# Patient Record
Sex: FEMALE | Race: WHITE | NOT HISPANIC OR LATINO | URBAN - METROPOLITAN AREA
[De-identification: names, ages, dates, MRNs, and addresses within clinical notes are randomized per-mention and may not be internally consistent; named-entity substitution may affect disease eponyms.]

---

## 2019-11-07 ENCOUNTER — SEE NOTE (OUTPATIENT)
Dept: URBAN - METROPOLITAN AREA CLINIC 32 | Facility: CLINIC | Age: 49
Setting detail: DERMATOLOGY
End: 2019-11-07

## 2019-11-07 DIAGNOSIS — D49.2 NEOPLASM OF UNSPECIFIED BEHAVIOR OF BONE, SOFT TISSUE, AND SKIN: ICD-10-CM

## 2019-11-07 PROCEDURE — 99202 OFFICE O/P NEW SF 15 MIN: CPT

## 2019-11-07 RX ORDER — ALUMINUM CHLORIDE 20 %
1 ML SOLUTION, NON-ORAL TOPICAL QHS
Qty: 60 | Refills: 6
Start: 2019-11-07

## 2021-05-11 ENCOUNTER — WEB ENCOUNTER (OUTPATIENT)
Dept: URBAN - METROPOLITAN AREA CLINIC 50 | Facility: CLINIC | Age: 51
End: 2021-05-11

## 2021-05-11 ENCOUNTER — OFFICE VISIT (OUTPATIENT)
Dept: URBAN - METROPOLITAN AREA CLINIC 50 | Facility: CLINIC | Age: 51
End: 2021-05-11
Payer: COMMERCIAL

## 2021-05-11 VITALS
WEIGHT: 168 LBS | BODY MASS INDEX: 32.98 KG/M2 | HEART RATE: 83 BPM | SYSTOLIC BLOOD PRESSURE: 123 MMHG | TEMPERATURE: 97.5 F | DIASTOLIC BLOOD PRESSURE: 81 MMHG | HEIGHT: 60 IN

## 2021-05-11 DIAGNOSIS — I88.9 LYMPHADENITIS: ICD-10-CM

## 2021-05-11 DIAGNOSIS — K21.00 GASTROESOPHAGEAL REFLUX DISEASE WITH ESOPHAGITIS WITHOUT HEMORRHAGE: ICD-10-CM

## 2021-05-11 PROCEDURE — 99213 OFFICE O/P EST LOW 20 MIN: CPT | Performed by: INTERNAL MEDICINE

## 2021-05-11 RX ORDER — ROSUVASTATIN CALCIUM 10 MG/1
1 TABLET TABLET, FILM COATED ORAL ONCE A DAY
Status: ACTIVE | COMMUNITY

## 2021-05-11 RX ORDER — TOPIRAMATE 25 MG/1
1 CAPSULE CAPSULE, COATED PELLETS ORAL ONCE A DAY
Status: ACTIVE | COMMUNITY

## 2021-05-11 RX ORDER — PANTOPRAZOLE SODIUM 40 MG/1
1 TABLET TABLET, DELAYED RELEASE ORAL ONCE A DAY
Qty: 30 | Refills: 3 | OUTPATIENT
Start: 2021-05-11

## 2021-05-11 RX ORDER — ERGOCALCIFEROL 1.25 MG/1
CAPSULE ORAL
Qty: 0 | Refills: 0 | Status: ACTIVE | COMMUNITY
Start: 1900-01-01

## 2021-05-11 RX ORDER — OMEPRAZOLE 40 MG/1
TAKE 1 CAPSULE (40 MG) BY ORAL ROUTE ONCE DAILY BEFORE A MEAL FOR 30 DAYS CAPSULE, DELAYED RELEASE PELLETS ORAL 1
Qty: 30 | Refills: 6 | Status: ACTIVE | COMMUNITY
Start: 2017-10-04

## 2021-05-11 NOTE — HPI-TODAY'S VISIT:
had egd/colon with dr. floyd in 2019. had bad gerd, been taking protonix for years. went to see pcp last month b/c her throat was hurting 5 days later, ENT told her it was very inflammed. ENT advised to take ppi twice a day but she did not do this. 2 days ago, neck and throat got worse. went to see pcp dr. johns.  was told she has a swollen LN. currently on an antibiotic for lymphadenitis, ? keflex Dr. clark is her ent, told it was gerd hx of losing voice pcp told her it was chronic otitis media has f/u with [facility] for labs and ct next week for Lymphadenitis has nto had covid vaccine

## 2021-10-12 ENCOUNTER — TELEPHONE ENCOUNTER (OUTPATIENT)
Dept: URBAN - METROPOLITAN AREA CLINIC 23 | Facility: CLINIC | Age: 51
End: 2021-10-12

## 2021-10-12 ENCOUNTER — TELEPHONE ENCOUNTER (OUTPATIENT)
Dept: URBAN - METROPOLITAN AREA CLINIC 98 | Facility: CLINIC | Age: 51
End: 2021-10-12

## 2021-10-12 RX ORDER — PANTOPRAZOLE SODIUM 40 MG/1
1 TABLET TABLET, DELAYED RELEASE ORAL ONCE A DAY
Qty: 30 | Refills: 3
Start: 2021-05-11

## 2021-10-13 ENCOUNTER — LAB OUTSIDE AN ENCOUNTER (OUTPATIENT)
Dept: URBAN - METROPOLITAN AREA CLINIC 80 | Facility: CLINIC | Age: 51
End: 2021-10-13

## 2021-10-13 ENCOUNTER — OFFICE VISIT (OUTPATIENT)
Dept: URBAN - METROPOLITAN AREA CLINIC 80 | Facility: CLINIC | Age: 51
End: 2021-10-13
Payer: COMMERCIAL

## 2021-10-13 DIAGNOSIS — K21.9 GASTROESOPHAGEAL REFLUX DISEASE, UNSPECIFIED WHETHER ESOPHAGITIS PRESENT: ICD-10-CM

## 2021-10-13 DIAGNOSIS — R49.0 HOARSE: ICD-10-CM

## 2021-10-13 DIAGNOSIS — Z80.0 FAMILY HISTORY OF COLON CANCER: ICD-10-CM

## 2021-10-13 PROCEDURE — 99213 OFFICE O/P EST LOW 20 MIN: CPT | Performed by: INTERNAL MEDICINE

## 2021-10-13 RX ORDER — TOPIRAMATE 25 MG/1
1 CAPSULE CAPSULE, COATED PELLETS ORAL ONCE A DAY
Status: ACTIVE | COMMUNITY

## 2021-10-13 RX ORDER — OMEPRAZOLE 40 MG/1
TAKE 1 CAPSULE (40 MG) BY ORAL ROUTE ONCE DAILY BEFORE A MEAL FOR 30 DAYS CAPSULE, DELAYED RELEASE PELLETS ORAL 1
Qty: 30 | Refills: 6 | Status: DISCONTINUED | COMMUNITY
Start: 2017-10-04

## 2021-10-13 RX ORDER — ROSUVASTATIN CALCIUM 10 MG/1
1 TABLET TABLET, FILM COATED ORAL ONCE A DAY
Status: ACTIVE | COMMUNITY

## 2021-10-13 RX ORDER — PANTOPRAZOLE SODIUM 40 MG/1
1 TABLET TABLET, DELAYED RELEASE ORAL ONCE A DAY
Qty: 30 | Refills: 3 | Status: ACTIVE | COMMUNITY
Start: 2021-05-11

## 2021-10-13 RX ORDER — ERGOCALCIFEROL 1.25 MG/1
CAPSULE ORAL
Qty: 0 | Refills: 0 | Status: DISCONTINUED | COMMUNITY
Start: 1900-01-01

## 2021-10-13 NOTE — HPI-TODAY'S VISIT:
Patient states she has bad acid reflux - her last egd and colon were in 2019  She has increased burning in her stomach and esophagus She was taking pantoprazole and ran out of it about a week and a half ago - has been miserable with it - drinking milk like crazy - she finally had it called back in today She is also having increased burning on urination - scheduled to see gyn on Friday  No hematuria - does have a foul odor BM were good but lately have been a little different but overall 1 BM in am No BRBPR or melena Her last colon was  - her sister  of colon cancer - her last colon was a fair prep and advised to repeat in 3 years - which would be 2022 No dysphagia

## 2021-10-25 ENCOUNTER — OFFICE VISIT (OUTPATIENT)
Dept: URBAN - METROPOLITAN AREA SURGERY CENTER 19 | Facility: SURGERY CENTER | Age: 51
End: 2021-10-25

## 2021-10-25 ENCOUNTER — CLAIMS CREATED FROM THE CLAIM WINDOW (OUTPATIENT)
Dept: URBAN - METROPOLITAN AREA SURGERY CENTER 19 | Facility: SURGERY CENTER | Age: 51
End: 2021-10-25
Payer: COMMERCIAL

## 2021-10-25 DIAGNOSIS — R12 HEARTBURN: ICD-10-CM

## 2021-10-25 DIAGNOSIS — K31.89 ACQUIRED DEFORMITY OF DUODENUM: ICD-10-CM

## 2021-10-25 PROCEDURE — G8907 PT DOC NO EVENTS ON DISCHARG: HCPCS | Performed by: INTERNAL MEDICINE

## 2021-10-25 PROCEDURE — 43239 EGD BIOPSY SINGLE/MULTIPLE: CPT | Performed by: INTERNAL MEDICINE

## 2021-10-25 RX ORDER — ROSUVASTATIN CALCIUM 10 MG/1
1 TABLET TABLET, FILM COATED ORAL ONCE A DAY
Status: ACTIVE | COMMUNITY

## 2021-10-25 RX ORDER — PANTOPRAZOLE SODIUM 40 MG/1
1 TABLET TABLET, DELAYED RELEASE ORAL ONCE A DAY
Qty: 30 | Refills: 3 | Status: ACTIVE | COMMUNITY
Start: 2021-05-11

## 2021-10-25 RX ORDER — TOPIRAMATE 25 MG/1
1 CAPSULE CAPSULE, COATED PELLETS ORAL ONCE A DAY
Status: ACTIVE | COMMUNITY

## 2021-10-26 ENCOUNTER — OFFICE VISIT (OUTPATIENT)
Dept: URBAN - METROPOLITAN AREA SURGERY CENTER 18 | Facility: SURGERY CENTER | Age: 51
End: 2021-10-26

## 2021-12-23 ENCOUNTER — TELEPHONE ENCOUNTER (OUTPATIENT)
Dept: URBAN - METROPOLITAN AREA CLINIC 6 | Facility: CLINIC | Age: 51
End: 2021-12-23

## 2021-12-23 RX ORDER — SUCRALFATE 1 G/1
1 TABLET ON AN EMPTY STOMACH TABLET ORAL
Qty: 120 | Refills: 4 | OUTPATIENT
Start: 2021-12-23 | End: 2022-05-22

## 2022-03-31 ENCOUNTER — TELEPHONE ENCOUNTER (OUTPATIENT)
Dept: URBAN - METROPOLITAN AREA CLINIC 96 | Facility: CLINIC | Age: 52
End: 2022-03-31

## 2022-03-31 RX ORDER — PANTOPRAZOLE SODIUM 40 MG/1
1 TABLET TABLET, DELAYED RELEASE ORAL ONCE A DAY
Qty: 90 TABLET | Refills: 3
Start: 2021-05-11

## 2022-10-27 ENCOUNTER — OFFICE VISIT (OUTPATIENT)
Dept: URBAN - METROPOLITAN AREA CLINIC 96 | Facility: CLINIC | Age: 52
End: 2022-10-27

## 2022-10-27 RX ORDER — TOPIRAMATE 25 MG/1
1 CAPSULE CAPSULE, COATED PELLETS ORAL ONCE A DAY
COMMUNITY

## 2022-10-27 RX ORDER — ROSUVASTATIN CALCIUM 10 MG/1
1 TABLET TABLET, FILM COATED ORAL ONCE A DAY
COMMUNITY

## 2022-10-27 RX ORDER — PANTOPRAZOLE SODIUM 40 MG/1
1 TABLET TABLET, DELAYED RELEASE ORAL ONCE A DAY
Qty: 90 TABLET | Refills: 3 | COMMUNITY
Start: 2021-05-11

## 2022-11-08 ENCOUNTER — OFFICE VISIT (OUTPATIENT)
Dept: URBAN - METROPOLITAN AREA TELEHEALTH 2 | Facility: TELEHEALTH | Age: 52
End: 2022-11-08
Payer: COMMERCIAL

## 2022-11-08 VITALS — WEIGHT: 142 LBS | HEIGHT: 60 IN | BODY MASS INDEX: 27.88 KG/M2

## 2022-11-08 DIAGNOSIS — K21.9 GASTROESOPHAGEAL REFLUX DISEASE, UNSPECIFIED WHETHER ESOPHAGITIS PRESENT: ICD-10-CM

## 2022-11-08 DIAGNOSIS — Z80.0 FAMILY HISTORY OF COLON CANCER: ICD-10-CM

## 2022-11-08 DIAGNOSIS — R49.0 HOARSE: ICD-10-CM

## 2022-11-08 PROCEDURE — 99214 OFFICE O/P EST MOD 30 MIN: CPT | Performed by: INTERNAL MEDICINE

## 2022-11-08 RX ORDER — SODIUM, POTASSIUM,MAG SULFATES 17.5-3.13G
177ML SOLUTION, RECONSTITUTED, ORAL ORAL
Qty: 1 | OUTPATIENT
Start: 2022-11-08 | End: 2022-11-10

## 2022-11-08 RX ORDER — ROSUVASTATIN CALCIUM 10 MG/1
1 TABLET TABLET, FILM COATED ORAL ONCE A DAY
COMMUNITY

## 2022-11-08 RX ORDER — PANTOPRAZOLE SODIUM 40 MG/1
1 TABLET TABLET, DELAYED RELEASE ORAL ONCE A DAY
Qty: 90 TABLET | Refills: 3 | COMMUNITY
Start: 2021-05-11

## 2022-11-08 RX ORDER — TOPIRAMATE 25 MG/1
1 CAPSULE CAPSULE, COATED PELLETS ORAL ONCE A DAY
COMMUNITY

## 2022-11-08 RX ORDER — PANTOPRAZOLE SODIUM 40 MG/1
1 TABLET TABLET, DELAYED RELEASE ORAL ONCE A DAY
Qty: 90 TABLET | Refills: 3
Start: 2021-05-11

## 2022-11-08 NOTE — HPI-TODAY'S VISIT:
meds: ozempic, emgality, rosuvastatin wants to discuss gerd taking carafate daily has not been taking ppi for 2 months wants an egd. had one in 10/2021 still w/ gerd sister w/ crc  =========================== Patient states she has bad acid reflux - her last egd and colon were in   She has increased burning in her stomach and esophagus She was taking pantoprazole and ran out of it about a week and a half ago - has been miserable with it - drinking milk like crazy - she finally had it called back in today She is also having increased burning on urination - scheduled to see gyn on Friday  No hematuria - does have a foul odor BM were good but lately have been a little different but overall 1 BM in am No BRBPR or melena Her last colon was  - her sister  of colon cancer - her last colon was a fair prep and advised to repeat in 3 years - which would be 2022 No dysphagia

## 2022-11-09 ENCOUNTER — TELEPHONE ENCOUNTER (OUTPATIENT)
Dept: URBAN - METROPOLITAN AREA CLINIC 6 | Facility: CLINIC | Age: 52
End: 2022-11-09

## 2022-11-15 ENCOUNTER — TELEPHONE ENCOUNTER (OUTPATIENT)
Dept: URBAN - METROPOLITAN AREA CLINIC 105 | Facility: CLINIC | Age: 52
End: 2022-11-15

## 2022-11-22 ENCOUNTER — TELEPHONE ENCOUNTER (OUTPATIENT)
Dept: URBAN - METROPOLITAN AREA CLINIC 80 | Facility: CLINIC | Age: 52
End: 2022-11-22

## 2022-12-01 PROBLEM — 235595009: Status: ACTIVE | Noted: 2021-10-12

## 2022-12-09 ENCOUNTER — TELEPHONE ENCOUNTER (OUTPATIENT)
Dept: URBAN - METROPOLITAN AREA TELEHEALTH 2 | Facility: TELEHEALTH | Age: 52
End: 2022-12-09

## 2022-12-09 ENCOUNTER — TELEPHONE ENCOUNTER (OUTPATIENT)
Dept: URBAN - METROPOLITAN AREA CLINIC 80 | Facility: CLINIC | Age: 52
End: 2022-12-09

## 2022-12-27 ENCOUNTER — OFFICE VISIT (OUTPATIENT)
Dept: URBAN - METROPOLITAN AREA SURGERY CENTER 18 | Facility: SURGERY CENTER | Age: 52
End: 2022-12-27

## 2023-01-06 ENCOUNTER — LAB OUTSIDE AN ENCOUNTER (OUTPATIENT)
Dept: URBAN - METROPOLITAN AREA TELEHEALTH 2 | Facility: TELEHEALTH | Age: 53
End: 2023-01-06

## 2023-01-06 ENCOUNTER — OFFICE VISIT (OUTPATIENT)
Dept: URBAN - METROPOLITAN AREA TELEHEALTH 2 | Facility: TELEHEALTH | Age: 53
End: 2023-01-06
Payer: COMMERCIAL

## 2023-01-06 VITALS — HEIGHT: 60 IN | BODY MASS INDEX: 27.88 KG/M2 | WEIGHT: 142 LBS

## 2023-01-06 DIAGNOSIS — Z80.0 FAMILY HISTORY OF COLON CANCER: ICD-10-CM

## 2023-01-06 DIAGNOSIS — R19.4 INCREASED BOWEL FREQUENCY: ICD-10-CM

## 2023-01-06 DIAGNOSIS — K21.00 GASTROESOPHAGEAL REFLUX DISEASE WITH ESOPHAGITIS WITHOUT HEMORRHAGE: ICD-10-CM

## 2023-01-06 PROCEDURE — 99214 OFFICE O/P EST MOD 30 MIN: CPT | Performed by: INTERNAL MEDICINE

## 2023-01-06 RX ORDER — PANTOPRAZOLE SODIUM 40 MG/1
1 TABLET TABLET, DELAYED RELEASE ORAL ONCE A DAY
Qty: 90 | Refills: 3 | OUTPATIENT
Start: 2021-05-11

## 2023-01-06 RX ORDER — ROSUVASTATIN CALCIUM 10 MG/1
1 TABLET TABLET, FILM COATED ORAL ONCE A DAY
COMMUNITY

## 2023-01-06 RX ORDER — PANTOPRAZOLE SODIUM 40 MG/1
1 TABLET TABLET, DELAYED RELEASE ORAL ONCE A DAY
Qty: 90 TABLET | Refills: 3 | Status: ACTIVE | COMMUNITY
Start: 2021-05-11

## 2023-01-06 RX ORDER — TOPIRAMATE 25 MG/1
1 CAPSULE CAPSULE, COATED PELLETS ORAL ONCE A DAY
COMMUNITY

## 2023-01-06 NOTE — HPI-TODAY'S VISIT:
The patient presents on follow-up for a change in bowel habit. She was previously seen by Elise Merino MD on 11/8/22.  Today, the patient reports a change in bowel habit starting at the end of Nov. She has frequent BMs and has taken Imodium. She has 8 BMs (no diarrhea) QD - small volume but formed. Prior, she would have 1 BM QD. She plans to schedule the colon that was already ordered by Dr. Merino (not in-network). She denies rectal bleeding. She reports a history of E.coli found in urine. Other symptoms are bloating, gas, heartburn, and belching. She previously took Sucralfate for a year, but says Dr. Merino instructed her to stop it immediately due to potential side-effects related to her kidney. Sucralfate provided a significant benefit. Reflux is well-controlled on pantoprazole 40 mg QD. She reports a history of H. pylori about 10 years ago that she was treated for. She went to [facility] - labs normal.

## 2023-01-23 ENCOUNTER — TELEPHONE ENCOUNTER (OUTPATIENT)
Dept: URBAN - METROPOLITAN AREA CLINIC 80 | Facility: CLINIC | Age: 53
End: 2023-01-23

## 2023-01-23 ENCOUNTER — OFFICE VISIT (OUTPATIENT)
Dept: URBAN - METROPOLITAN AREA SURGERY CENTER 16 | Facility: SURGERY CENTER | Age: 53
End: 2023-01-23

## 2023-01-26 ENCOUNTER — OFFICE VISIT (OUTPATIENT)
Dept: URBAN - METROPOLITAN AREA SURGERY CENTER 16 | Facility: SURGERY CENTER | Age: 53
End: 2023-01-26

## 2023-01-27 ENCOUNTER — TELEPHONE ENCOUNTER (OUTPATIENT)
Dept: URBAN - METROPOLITAN AREA CLINIC 105 | Facility: CLINIC | Age: 53
End: 2023-01-27

## 2023-01-30 ENCOUNTER — ERX REFILL RESPONSE (OUTPATIENT)
Dept: URBAN - METROPOLITAN AREA CLINIC 80 | Facility: CLINIC | Age: 53
End: 2023-01-30

## 2023-01-30 RX ORDER — SUCRALFATE 1 G/1
TAKE 1 TABLET BY MOUTH ON AN EMPTY STOMACH BEFORE MEALS AND AT BEDTIME TABLET ORAL
Qty: 360 TABLET | Refills: 1 | OUTPATIENT

## 2023-01-30 RX ORDER — SUCRALFATE 1 G/1
TAKE 1 TABLET BY MOUTH ON AN EMPTY STOMACH BEFORE MEALS AND AT BEDTIME TABLET ORAL
Qty: 360 TABLET | Refills: 2 | OUTPATIENT

## 2023-01-31 ENCOUNTER — OFFICE VISIT (OUTPATIENT)
Dept: URBAN - METROPOLITAN AREA MEDICAL CENTER 33 | Facility: MEDICAL CENTER | Age: 53
End: 2023-01-31
Payer: COMMERCIAL

## 2023-01-31 VITALS — HEIGHT: 60 IN

## 2023-01-31 DIAGNOSIS — R19.4 ALTERATION IN BOWEL ELIMINATION: ICD-10-CM

## 2023-01-31 PROCEDURE — 45378 DIAGNOSTIC COLONOSCOPY: CPT | Performed by: INTERNAL MEDICINE

## 2023-01-31 RX ORDER — ROSUVASTATIN CALCIUM 10 MG/1
1 TABLET TABLET, FILM COATED ORAL ONCE A DAY
Status: ON HOLD | COMMUNITY

## 2023-01-31 RX ORDER — PANTOPRAZOLE SODIUM 40 MG/1
1 TABLET TABLET, DELAYED RELEASE ORAL ONCE A DAY
Qty: 90 | Refills: 3 | Status: ON HOLD | COMMUNITY
Start: 2021-05-11

## 2023-01-31 RX ORDER — TOPIRAMATE 25 MG/1
1 CAPSULE CAPSULE, COATED PELLETS ORAL ONCE A DAY
Status: ON HOLD | COMMUNITY

## 2023-02-07 ENCOUNTER — LAB OUTSIDE AN ENCOUNTER (OUTPATIENT)
Dept: URBAN - METROPOLITAN AREA CLINIC 105 | Facility: CLINIC | Age: 53
End: 2023-02-07

## 2023-02-07 ENCOUNTER — OFFICE VISIT (OUTPATIENT)
Dept: URBAN - METROPOLITAN AREA CLINIC 105 | Facility: CLINIC | Age: 53
End: 2023-02-07
Payer: COMMERCIAL

## 2023-02-07 ENCOUNTER — TELEPHONE ENCOUNTER (OUTPATIENT)
Dept: URBAN - METROPOLITAN AREA CLINIC 105 | Facility: CLINIC | Age: 53
End: 2023-02-07

## 2023-02-07 VITALS
DIASTOLIC BLOOD PRESSURE: 71 MMHG | HEIGHT: 60 IN | BODY MASS INDEX: 28.7 KG/M2 | TEMPERATURE: 98.1 F | HEART RATE: 83 BPM | WEIGHT: 146.2 LBS | SYSTOLIC BLOOD PRESSURE: 109 MMHG

## 2023-02-07 DIAGNOSIS — Z80.0 FAMILY HISTORY OF COLON CANCER: ICD-10-CM

## 2023-02-07 DIAGNOSIS — R19.4 CHANGE IN BOWEL HABIT: ICD-10-CM

## 2023-02-07 DIAGNOSIS — K21.00 GASTROESOPHAGEAL REFLUX DISEASE WITH ESOPHAGITIS WITHOUT HEMORRHAGE: ICD-10-CM

## 2023-02-07 DIAGNOSIS — R10.13 EPIGASTRIC PAIN: ICD-10-CM

## 2023-02-07 PROBLEM — 14760008 CONSTIPATION: Status: ACTIVE | Noted: 2023-02-07

## 2023-02-07 PROCEDURE — 99214 OFFICE O/P EST MOD 30 MIN: CPT | Performed by: INTERNAL MEDICINE

## 2023-02-07 RX ORDER — SUCRALFATE 1 G/1
TAKE 1 TABLET BY MOUTH ON AN EMPTY STOMACH BEFORE MEALS AND AT BEDTIME TABLET ORAL
Qty: 360 TABLET | Refills: 1 | Status: ON HOLD | COMMUNITY

## 2023-02-07 RX ORDER — TOPIRAMATE 25 MG/1
1 CAPSULE CAPSULE, COATED PELLETS ORAL ONCE A DAY
Status: ACTIVE | COMMUNITY

## 2023-02-07 RX ORDER — ROSUVASTATIN CALCIUM 10 MG/1
1 TABLET TABLET, FILM COATED ORAL ONCE A DAY
Status: ACTIVE | COMMUNITY

## 2023-02-07 RX ORDER — PANTOPRAZOLE SODIUM 40 MG/1
1 TABLET TABLET, DELAYED RELEASE ORAL ONCE A DAY
Qty: 90 | Refills: 3 | Status: ACTIVE | COMMUNITY
Start: 2021-05-11

## 2023-02-07 NOTE — HPI-TODAY'S VISIT:
The patient presents on follow-up for a change in bowel habit. She was previously seen by Elise Merino MD on 11/8/22.  On 1/6/23, the patient reported a change in bowel habit starting at the end of Nov. She had frequent BMs and had taken Imodium. She had 8 BMs (no diarrhea) QD - small volume but formed. Prior, she would have 1 BM QD. She planned to schedule the colon that was already ordered by Dr. Merino (not in-network). She denied rectal bleeding. She reported a history of E.coli found in urine. Other symptoms were bloating, gas, heartburn, and belching. She previously took Sucralfate for a year, but said Dr. Merino instructed her to stop it immediately due to potential side-effects related to her kidney. Sucralfate provided a significant benefit. Reflux was well-controlled on pantoprazole 40 mg QD. She reported a history of H. pylori about 10 years ago that she was treated for. She went to [facility] - labs normal.  HPI: Today, she says she did not have a BM for the 3 days after her colon. She has 7 BMs (like strips) QD, but no diarrhea. She has good evacuation. She sees some blood with a BM. She has not taken Miralax in awhile. She has not taken sucralfate since Oct. She continues on pantoprazole 40 mg QD. She feels abdominal tightness, indigestion, and gas. She takes Gas-x BID.

## 2023-02-08 ENCOUNTER — TELEPHONE ENCOUNTER (OUTPATIENT)
Dept: URBAN - METROPOLITAN AREA CLINIC 105 | Facility: CLINIC | Age: 53
End: 2023-02-08

## 2023-02-09 ENCOUNTER — TELEPHONE ENCOUNTER (OUTPATIENT)
Dept: URBAN - METROPOLITAN AREA CLINIC 92 | Facility: CLINIC | Age: 53
End: 2023-02-09

## 2023-02-09 LAB
A/G RATIO: 1.7
ABSOLUTE BASOPHILS: 70
ABSOLUTE EOSINOPHILS: 99
ABSOLUTE LYMPHOCYTES: 2413
ABSOLUTE MONOCYTES: 406
ABSOLUTE NEUTROPHILS: 2813
ALBUMIN: 4.4
ALKALINE PHOSPHATASE: 52
ALT (SGPT): 16
AST (SGOT): 15
BASOPHILS: 1.2
BILIRUBIN, TOTAL: 0.3
BUN/CREATININE RATIO: (no result)
BUN: 15
CALCIUM: 9.3
CARBON DIOXIDE, TOTAL: 27
CHLORIDE: 107
CREATININE: 0.54
EGFR: 111
EOSINOPHILS: 1.7
GLOBULIN, TOTAL: 2.6
GLUCOSE: 81
HEMATOCRIT: 35.3
HEMOGLOBIN: 11.5
IMMUNOGLOBULIN A, QN, SERUM: 196
LYMPHOCYTES: 41.6
MCH: 27.6
MCHC: 32.6
MCV: 84.9
MONOCYTES: 7
MPV: 10
NEUTROPHILS: 48.5
PLATELET COUNT: 356
POTASSIUM: 3.9
PROTEIN, TOTAL: 7
RDW: 13
RED BLOOD CELL COUNT: 4.16
SODIUM: 142
T-TRANSGLUTAMINASE (TTG) IGA: <1
TSH W/REFLEX TO FT4: 0.87
WHITE BLOOD CELL COUNT: 5.8

## 2023-02-28 PROBLEM — 71057007 E. COLI INFECTION: Status: ACTIVE | Noted: 2023-02-28

## 2023-03-03 ENCOUNTER — OFFICE VISIT (OUTPATIENT)
Dept: URBAN - METROPOLITAN AREA TELEHEALTH 2 | Facility: TELEHEALTH | Age: 53
End: 2023-03-03

## 2023-03-03 VITALS — WEIGHT: 146 LBS | BODY MASS INDEX: 28.66 KG/M2 | HEIGHT: 60 IN

## 2023-03-03 RX ORDER — TOPIRAMATE 25 MG/1
1 CAPSULE CAPSULE, COATED PELLETS ORAL ONCE A DAY
Status: ACTIVE | COMMUNITY

## 2023-03-03 RX ORDER — ROSUVASTATIN CALCIUM 10 MG/1
1 TABLET TABLET, FILM COATED ORAL ONCE A DAY
Status: ACTIVE | COMMUNITY

## 2023-03-03 RX ORDER — PANTOPRAZOLE SODIUM 40 MG/1
1 TABLET TABLET, DELAYED RELEASE ORAL ONCE A DAY
Qty: 90 | Refills: 3 | Status: ACTIVE | COMMUNITY
Start: 2021-05-11

## 2023-03-03 RX ORDER — SUCRALFATE 1 G/1
TAKE 1 TABLET BY MOUTH ON AN EMPTY STOMACH BEFORE MEALS AND AT BEDTIME TABLET ORAL
Qty: 360 TABLET | Refills: 1 | Status: ON HOLD | COMMUNITY

## 2023-03-13 ENCOUNTER — OFFICE VISIT (OUTPATIENT)
Dept: URBAN - METROPOLITAN AREA SURGERY CENTER 16 | Facility: SURGERY CENTER | Age: 53
End: 2023-03-13

## 2023-04-18 ENCOUNTER — OFFICE VISIT (OUTPATIENT)
Dept: URBAN - METROPOLITAN AREA MEDICAL CENTER 33 | Facility: MEDICAL CENTER | Age: 53
End: 2023-04-18

## 2023-09-08 ENCOUNTER — OFFICE VISIT (OUTPATIENT)
Dept: URBAN - METROPOLITAN AREA MEDICAL CENTER 33 | Facility: MEDICAL CENTER | Age: 53
End: 2023-09-08
Payer: COMMERCIAL

## 2023-09-08 DIAGNOSIS — K20.80 ESOPHAGITIS DISSECANS SUPERFICIALIS: ICD-10-CM

## 2023-09-08 PROCEDURE — 43239 EGD BIOPSY SINGLE/MULTIPLE: CPT | Performed by: INTERNAL MEDICINE

## 2023-09-08 RX ORDER — TOPIRAMATE 25 MG/1
1 CAPSULE CAPSULE, COATED PELLETS ORAL ONCE A DAY
Status: ACTIVE | COMMUNITY

## 2023-09-08 RX ORDER — PANTOPRAZOLE SODIUM 40 MG/1
1 TABLET TABLET, DELAYED RELEASE ORAL ONCE A DAY
Qty: 90 | Refills: 3 | Status: ACTIVE | COMMUNITY
Start: 2021-05-11

## 2023-09-08 RX ORDER — SUCRALFATE 1 G/1
TAKE 1 TABLET BY MOUTH ON AN EMPTY STOMACH BEFORE MEALS AND AT BEDTIME TABLET ORAL
Qty: 360 TABLET | Refills: 1 | Status: ON HOLD | COMMUNITY

## 2023-09-08 RX ORDER — ROSUVASTATIN CALCIUM 10 MG/1
1 TABLET TABLET, FILM COATED ORAL ONCE A DAY
Status: ACTIVE | COMMUNITY

## 2023-09-27 ENCOUNTER — OFFICE VISIT (OUTPATIENT)
Dept: URBAN - METROPOLITAN AREA CLINIC 105 | Facility: CLINIC | Age: 53
End: 2023-09-27
Payer: COMMERCIAL

## 2023-09-27 VITALS
SYSTOLIC BLOOD PRESSURE: 122 MMHG | WEIGHT: 157 LBS | HEIGHT: 60 IN | HEART RATE: 98 BPM | BODY MASS INDEX: 30.82 KG/M2 | TEMPERATURE: 97.5 F | DIASTOLIC BLOOD PRESSURE: 86 MMHG

## 2023-09-27 DIAGNOSIS — D64.89 ANEMIA DUE TO OTHER CAUSE: ICD-10-CM

## 2023-09-27 DIAGNOSIS — R10.13 EPIGASTRIC PAIN: ICD-10-CM

## 2023-09-27 DIAGNOSIS — K21.00 GASTROESOPHAGEAL REFLUX DISEASE WITH ESOPHAGITIS WITHOUT HEMORRHAGE: ICD-10-CM

## 2023-09-27 DIAGNOSIS — Z80.0 FAMILY HISTORY OF COLON CANCER: ICD-10-CM

## 2023-09-27 PROBLEM — 14760008: Status: ACTIVE | Noted: 2023-09-27

## 2023-09-27 PROBLEM — 266433003: Status: ACTIVE | Noted: 2021-10-19

## 2023-09-27 PROCEDURE — 99213 OFFICE O/P EST LOW 20 MIN: CPT | Performed by: INTERNAL MEDICINE

## 2023-09-27 RX ORDER — ROSUVASTATIN CALCIUM 10 MG/1
1 TABLET TABLET, FILM COATED ORAL ONCE A DAY
Status: ACTIVE | COMMUNITY

## 2023-09-27 RX ORDER — SUCRALFATE 1 G/1
TAKE 1 TABLET BY MOUTH ON AN EMPTY STOMACH BEFORE MEALS AND AT BEDTIME TABLET ORAL
Qty: 360 TABLET | Refills: 1 | Status: ON HOLD | COMMUNITY

## 2023-09-27 RX ORDER — TOPIRAMATE 25 MG/1
1 CAPSULE CAPSULE, COATED PELLETS ORAL ONCE A DAY
Status: ACTIVE | COMMUNITY

## 2023-09-27 RX ORDER — PANTOPRAZOLE SODIUM 40 MG/1
1 TABLET TABLET, DELAYED RELEASE ORAL ONCE A DAY
Qty: 90 | Refills: 3 | Status: ACTIVE | COMMUNITY
Start: 2021-05-11

## 2023-09-27 NOTE — HPI-TODAY'S VISIT:
The patient presents on follow-up for a change in bowel habit. She was previously seen by Elise Merino MD on 11/8/22.  On 1/6/23, the patient reported a change in bowel habit starting at the end of Nov. She had frequent BMs and had taken Imodium. She had 8 BMs (no diarrhea) QD - small volume but formed. Prior, she would have 1 BM QD. She planned to schedule the colon that was already ordered by Dr. Merino (not in-network). She denied rectal bleeding. She reported a history of E.coli found in urine. Other symptoms were bloating, gas, heartburn, and belching. She previously took Sucralfate for a year, but said Dr. Merino instructed her to stop it immediately due to potential side-effects related to her kidney. Sucralfate provided a significant benefit. Reflux was well-controlled on pantoprazole 40 mg QD. She reported a history of H. pylori about 10 years ago that she was treated for. She went to [facility] - labs normal.  On 2/7/23, she said she did not have a BM for the 3 days after her colon. She had 7 BMs (like strips) QD, but no diarrhea. She had good evacuation. She saw some blood with a BM. She had not taken Miralax in awhile. She had not taken sucralfate since Oct. She continued on pantoprazole 40 mg QD. She felt abdominal tightness, indigestion, and gas. She took Gas-x BID.  HPI: Today, she says she is taking pantoprazole 40 mg QAM. She can go weeks without taking Miralax. She currently has 1 BM/day with good evacuation. After her CT, which noted constipation, she increased fiber intake. Some abdominal discomfort if she has not eaten.   Labs 2/7/23 - CBC normal except hgb 11.5. CMP, TSH/FT4 all normal. Celiac negative.

## 2023-12-14 ENCOUNTER — DASHBOARD ENCOUNTERS (OUTPATIENT)
Age: 53
End: 2023-12-14

## 2023-12-20 ENCOUNTER — OFFICE VISIT (OUTPATIENT)
Dept: URBAN - METROPOLITAN AREA CLINIC 105 | Facility: CLINIC | Age: 53
End: 2023-12-20

## 2023-12-20 RX ORDER — PANTOPRAZOLE SODIUM 40 MG/1
1 TABLET TABLET, DELAYED RELEASE ORAL ONCE A DAY
Qty: 90 | Refills: 3 | COMMUNITY
Start: 2021-05-11

## 2023-12-20 RX ORDER — SUCRALFATE 1 G/1
TAKE 1 TABLET BY MOUTH ON AN EMPTY STOMACH BEFORE MEALS AND AT BEDTIME TABLET ORAL
Qty: 360 TABLET | Refills: 1 | COMMUNITY

## 2023-12-20 RX ORDER — ROSUVASTATIN CALCIUM 10 MG/1
1 TABLET TABLET, FILM COATED ORAL ONCE A DAY
COMMUNITY

## 2023-12-20 RX ORDER — TOPIRAMATE 25 MG/1
1 CAPSULE CAPSULE, COATED PELLETS ORAL ONCE A DAY
COMMUNITY

## 2024-10-28 ENCOUNTER — OFFICE VISIT (OUTPATIENT)
Dept: URBAN - METROPOLITAN AREA CLINIC 80 | Facility: CLINIC | Age: 54
End: 2024-10-28
Payer: COMMERCIAL

## 2024-10-28 VITALS
HEART RATE: 80 BPM | BODY MASS INDEX: 30.82 KG/M2 | WEIGHT: 157 LBS | SYSTOLIC BLOOD PRESSURE: 122 MMHG | HEIGHT: 60 IN | DIASTOLIC BLOOD PRESSURE: 84 MMHG | TEMPERATURE: 97.2 F

## 2024-10-28 DIAGNOSIS — K21.00 GASTROESOPHAGEAL REFLUX DISEASE WITH ESOPHAGITIS WITHOUT HEMORRHAGE: ICD-10-CM

## 2024-10-28 DIAGNOSIS — R10.13 EPIGASTRIC PAIN: ICD-10-CM

## 2024-10-28 DIAGNOSIS — R11.2 NAUSEA AND VOMITING IN ADULT: ICD-10-CM

## 2024-10-28 DIAGNOSIS — K59.00 CONSTIPATION, UNSPECIFIED CONSTIPATION TYPE: ICD-10-CM

## 2024-10-28 DIAGNOSIS — Z80.0 FAMILY HISTORY OF COLON CANCER: ICD-10-CM

## 2024-10-28 DIAGNOSIS — D64.9 ANEMIA, UNSPECIFIED TYPE: ICD-10-CM

## 2024-10-28 PROCEDURE — 99213 OFFICE O/P EST LOW 20 MIN: CPT | Performed by: PHYSICIAN ASSISTANT

## 2024-10-28 RX ORDER — SUCRALFATE 1 G/1
TAKE 1 TABLET BY MOUTH ON AN EMPTY STOMACH BEFORE MEALS AND AT BEDTIME TABLET ORAL
Qty: 360 TABLET | Refills: 1 | Status: DISCONTINUED | COMMUNITY

## 2024-10-28 RX ORDER — ONDANSETRON HYDROCHLORIDE 4 MG/1
TABLET, FILM COATED ORAL
Qty: 15 TABLET | Status: ACTIVE | COMMUNITY

## 2024-10-28 RX ORDER — PANTOPRAZOLE SODIUM 40 MG/1
1 TABLET TABLET, DELAYED RELEASE ORAL ONCE A DAY
Qty: 90 | Refills: 3 | Status: ACTIVE | COMMUNITY
Start: 2021-05-11

## 2024-10-28 RX ORDER — TOPIRAMATE 25 MG/1
1 CAPSULE CAPSULE, COATED PELLETS ORAL ONCE A DAY
COMMUNITY

## 2024-10-28 RX ORDER — ROSUVASTATIN 10 MG/1
1 TABLET TABLET, FILM COATED ORAL ONCE A DAY
COMMUNITY

## 2024-10-28 NOTE — HPI-ZZZTODAY'S VISIT
Patient had her last colonoscopy in January 2023.  It showed a normal colonoscopy and recommended repeat colonoscopy in 5 years family history of colon cancer.  Last upper endoscopy was in September 2023 showing a 2 cm hiatal hernia, a 5 mm superficial minimal gastric antral erosion.  Pathology at that time showed just some mild reflux changes in the esophagus.  had gone to er in August for nausea and emesis.   CT scan showed: No acute abnormality identified to account for abdominal pain. Uterine fibroids. New small right ovarian cyst. If still of reproductive age, no further imaging surveillance is recommended. In the postmenopausal setting, a follow-up pelvic ultrasound may be obtained in 6-12 months. Small nonobstructing left renal stones. Pelvic ultrasound showed: Similar appearance of fundal fibroid within the uterus. 2.1 cm right ovarian simple-appearing cyst, likely a follicle or corpus luteum. No evidence of right ovarian torsion. Nonvisualization of the left ovary.    Pt states she has some nausea - started Sept 10th - dealt with nausea and emesis all month - urgent care put her on an antibiotic for 5 days - she only took it for 3 days because did not notice anything different she was then sent to [facility] and she did not like them she thinks the chinese food she had may have caused it - MSG? she was also having bad headache with it has seen neurologist - she has a hx of migraines but her nausea and emesis strated before the recent headaches she was taking Ibuprofen and excedrine -- was then put on Amitriptyline 1 month ago - but she was taking it as needed - did not know it was for taking it daily she does take Pantoprazole, Famotidine and Zofran for her nausea and heartburn no dysphagia - last monday it came back again but is gone now currently she is feeling ok she is always constipated it takes her 3 days to have a BM - no BRBPR or melena does not feel Miralax is fully working

## 2025-03-28 ENCOUNTER — OFFICE VISIT (OUTPATIENT)
Dept: URBAN - METROPOLITAN AREA TELEHEALTH 2 | Facility: TELEHEALTH | Age: 55
End: 2025-03-28
Payer: COMMERCIAL

## 2025-03-28 DIAGNOSIS — Z87.19 HISTORY OF GASTROESOPHAGEAL REFLUX (GERD): ICD-10-CM

## 2025-03-28 DIAGNOSIS — K58.1 IRRITABLE BOWEL SYNDROME WITH CONSTIPATION: ICD-10-CM

## 2025-03-28 DIAGNOSIS — Z80.0 FAMILY HISTORY OF COLON CANCER: ICD-10-CM

## 2025-03-28 PROBLEM — 440630006: Status: ACTIVE | Noted: 2025-03-28

## 2025-03-28 PROCEDURE — 99213 OFFICE O/P EST LOW 20 MIN: CPT | Performed by: PHYSICIAN ASSISTANT

## 2025-03-28 RX ORDER — TOPIRAMATE 25 MG/1
1 CAPSULE CAPSULE, COATED PELLETS ORAL ONCE A DAY
COMMUNITY

## 2025-03-28 RX ORDER — ROSUVASTATIN 10 MG/1
1 TABLET TABLET, FILM COATED ORAL ONCE A DAY
COMMUNITY

## 2025-03-28 RX ORDER — FAMOTIDINE 40 MG/1
1 TABLET TABLET, FILM COATED ORAL ONCE A DAY
Qty: 90 TABLET | Refills: 3

## 2025-03-28 RX ORDER — ONDANSETRON HYDROCHLORIDE 4 MG/1
TABLET, FILM COATED ORAL
Qty: 15 TABLET | Status: ACTIVE | COMMUNITY

## 2025-03-28 RX ORDER — PANTOPRAZOLE SODIUM 40 MG/1
1 TABLET TABLET, DELAYED RELEASE ORAL ONCE A DAY
Qty: 90 | Refills: 3 | Status: ACTIVE | COMMUNITY
Start: 2021-05-11

## 2025-03-28 RX ORDER — TENAPANOR HYDROCHLORIDE 53.2 MG/1
1 TABLET IMMEDIATELY BEFORE MEALS TABLET ORAL TWICE A DAY
Qty: 180 TABLET | Refills: 3 | OUTPATIENT
Start: 2025-03-28

## 2025-03-28 NOTE — HPI-TODAY'S VISIT:
Pt had to go to the hospital on Tues becaues she was having abd pain - complication from Ozempic - her HgbA1C was 6.4 - she started to have constipation - her bowels had been great and normal until starting Ozempic - she was not having to take the Ibsrela daily - just a few times a week - worked great for her - does not work with the Ozempic she was also found to have  her GB ultrasound was normal - labs wnl - urine culture no growth she has been vomiting a lot with the Ozempic as well the last time she took the Ozempic was a week ago  she was able to have a BM yesterday - had to take Miralax and Ibsrela to help her go - was working amazing prior to all this she had no bm for 4 days prior she did not like the Linzess, she did like the Ibsrela - was having normal stools on it she was taking dicyclomine and zofran on the Ozempc with no help Her neurologist has her ont he Amitritpyline she is still on the Pantorpazole ER put her on Reglan and her nausea is better now

## 2025-04-02 ENCOUNTER — TELEPHONE ENCOUNTER (OUTPATIENT)
Dept: URBAN - METROPOLITAN AREA CLINIC 80 | Facility: CLINIC | Age: 55
End: 2025-04-02

## 2025-04-10 ENCOUNTER — OFFICE VISIT (OUTPATIENT)
Dept: URBAN - METROPOLITAN AREA CLINIC 80 | Facility: CLINIC | Age: 55
End: 2025-04-10